# Patient Record
Sex: FEMALE | Race: WHITE | Employment: OTHER | URBAN - METROPOLITAN AREA
[De-identification: names, ages, dates, MRNs, and addresses within clinical notes are randomized per-mention and may not be internally consistent; named-entity substitution may affect disease eponyms.]

---

## 2017-11-30 NOTE — PATIENT DISCUSSION
(H25.11) Age-related nuclear cataract, right eye - Assesment : Examination revealed cataract. Pt reports not bothered at this time. Pt reports BP elevated during CE OS. - Plan : Monitor for changes. Advised patient to call our office with decreased vision or increased symptoms. Updated GLRx given today. RTC in 1 year for Exam, sooner if problems or changes.

## 2017-11-30 NOTE — PATIENT DISCUSSION
Monitor for changes. Advised patient to call our office with decreased vision or increased symptoms.

## 2020-11-12 ENCOUNTER — ESTABLISHED COMPREHENSIVE EXAM (OUTPATIENT)
Dept: URBAN - METROPOLITAN AREA CLINIC 42 | Facility: CLINIC | Age: 75
End: 2020-11-12

## 2020-11-12 DIAGNOSIS — H35.373: ICD-10-CM

## 2020-11-12 DIAGNOSIS — H52.223: ICD-10-CM

## 2020-11-12 DIAGNOSIS — H52.4: ICD-10-CM

## 2020-11-12 DIAGNOSIS — H52.03: ICD-10-CM

## 2020-11-12 PROCEDURE — 92015 DETERMINE REFRACTIVE STATE: CPT

## 2020-11-12 PROCEDURE — 92134 CPTRZ OPH DX IMG PST SGM RTA: CPT

## 2020-11-12 PROCEDURE — 92014 COMPRE OPH EXAM EST PT 1/>: CPT

## 2020-11-12 ASSESSMENT — VISUAL ACUITY
OD_SC: 20/200
OS_SC: 20/100
OU_SC: 20/70-1
OU_SC: 20/200
OD_SC: 20/80
OS_SC: 20/200

## 2020-11-12 ASSESSMENT — KERATOMETRY
OD_K1POWER_DIOPTERS: 43.25
OS_K1POWER_DIOPTERS: 43.25
OD_AXISANGLE2_DEGREES: 119
OD_K2POWER_DIOPTERS: 44.25
OS_K2POWER_DIOPTERS: 44
OS_AXISANGLE2_DEGREES: 74
OD_AXISANGLE_DEGREES: 29
OS_AXISANGLE_DEGREES: 164

## 2020-11-12 ASSESSMENT — TONOMETRY
OS_IOP_MMHG: 20
OD_IOP_MMHG: 20

## 2021-04-20 NOTE — PATIENT DISCUSSION
Borderline intraocular pressure, fluctuates OU. Will continue to monitor. Schedule HVF/RNFL OU next available.

## 2021-06-09 NOTE — PATIENT DISCUSSION
Per JRB's last visit on 11/18/19: Eye OD, Iol Type Limited focus, Post Operative Target [place]/-0.50, Package  LF.

## 2021-06-09 NOTE — PATIENT DISCUSSION
Pt is bothered and symptomatic OD. Recommended CE to improve visual function OD. Discussed procedure, risks, benefits, and lens options. Pt wishes to proceed. Schedule A-scan and consult.

## 2021-07-12 NOTE — PATIENT DISCUSSION
RTC in 1 year for Exam with [facility], sooner if problems or changes.

## 2021-08-10 NOTE — PATIENT DISCUSSION
Per JRB's last visit on 11/18/19: Eye OD, Iol Type Limited focus, Post Operative Target [place]/-0.50, Package  LF.

## 2021-09-07 NOTE — PATIENT DISCUSSION
The risks, benefits and alternatives of cataract surgery were discussed with the patient. Risks including but not limited to: Infection, retinal detachment, lens dislocation, inflammation, loss of vision, increased pressure and need for further surgery. We discussed all the lens options including monofocal lens, toric lens, multifocal lens, astigmatism correction and other options. The patient understands that they may need glasses for optimal vision with any option. Yes...

## 2022-03-29 ENCOUNTER — COMPREHENSIVE EXAM (OUTPATIENT)
Dept: URBAN - METROPOLITAN AREA CLINIC 42 | Facility: CLINIC | Age: 77
End: 2022-03-29

## 2022-03-29 DIAGNOSIS — H52.223: ICD-10-CM

## 2022-03-29 DIAGNOSIS — H52.4: ICD-10-CM

## 2022-03-29 DIAGNOSIS — H35.413: ICD-10-CM

## 2022-03-29 DIAGNOSIS — H52.03: ICD-10-CM

## 2022-03-29 DIAGNOSIS — H35.373: ICD-10-CM

## 2022-03-29 PROCEDURE — 92015 DETERMINE REFRACTIVE STATE: CPT

## 2022-03-29 PROCEDURE — 92134 CPTRZ OPH DX IMG PST SGM RTA: CPT

## 2022-03-29 PROCEDURE — 92014 COMPRE OPH EXAM EST PT 1/>: CPT

## 2022-03-29 ASSESSMENT — TONOMETRY
OS_IOP_MMHG: 18
OD_IOP_MMHG: 18

## 2022-03-29 ASSESSMENT — KERATOMETRY
OS_AXISANGLE_DEGREES: 179
OD_AXISANGLE2_DEGREES: 117
OS_AXISANGLE2_DEGREES: 74
OS_K2POWER_DIOPTERS: 44
OS_K2POWER_DIOPTERS: 44.00
OS_AXISANGLE2_DEGREES: 089
OS_AXISANGLE_DEGREES: 164
OS_K1POWER_DIOPTERS: 43.25
OD_AXISANGLE2_DEGREES: 119
OD_AXISANGLE_DEGREES: 29
OD_K1POWER_DIOPTERS: 43.25
OD_K2POWER_DIOPTERS: 44.25
OD_AXISANGLE_DEGREES: 27

## 2022-03-29 ASSESSMENT — VISUAL ACUITY
OS_SC: 20/70-1
OD_SC: 20/60-2
OU_SC: 20/200
OD_SC: 20/200
OU_SC: 20/60
OS_SC: 20/200

## 2023-04-19 ENCOUNTER — COMPREHENSIVE EXAM (OUTPATIENT)
Dept: URBAN - METROPOLITAN AREA CLINIC 42 | Facility: CLINIC | Age: 78
End: 2023-04-19

## 2023-04-19 DIAGNOSIS — Z01.00: ICD-10-CM

## 2023-04-19 DIAGNOSIS — H52.4: ICD-10-CM

## 2023-04-19 DIAGNOSIS — H52.03: ICD-10-CM

## 2023-04-19 DIAGNOSIS — H52.223: ICD-10-CM

## 2023-04-19 PROCEDURE — 92014 COMPRE OPH EXAM EST PT 1/>: CPT

## 2023-04-19 PROCEDURE — 92015 DETERMINE REFRACTIVE STATE: CPT

## 2023-04-19 ASSESSMENT — KERATOMETRY
OS_AXISANGLE2_DEGREES: 2
OD_K1POWER_DIOPTERS: 44.50
OS_AXISANGLE2_DEGREES: 74
OS_K2POWER_DIOPTERS: 44
OD_K2POWER_DIOPTERS: 43.75
OD_AXISANGLE2_DEGREES: 119
OD_AXISANGLE2_DEGREES: 31
OD_K2POWER_DIOPTERS: 44.25
OS_AXISANGLE_DEGREES: 164
OS_AXISANGLE_DEGREES: 92
OS_AXISANGLE_DEGREES: 179
OS_K2POWER_DIOPTERS: 43.25
OS_K1POWER_DIOPTERS: 43.25
OS_K1POWER_DIOPTERS: 44.00
OD_AXISANGLE_DEGREES: 121
OD_AXISANGLE2_DEGREES: 117
OD_AXISANGLE_DEGREES: 29
OD_AXISANGLE_DEGREES: 27
OD_K1POWER_DIOPTERS: 43.25
OS_AXISANGLE2_DEGREES: 089
OS_K2POWER_DIOPTERS: 44.00

## 2023-04-19 ASSESSMENT — TONOMETRY
OD_IOP_MMHG: 18
OS_IOP_MMHG: 19

## 2024-04-22 ENCOUNTER — COMPREHENSIVE EXAM (OUTPATIENT)
Dept: URBAN - METROPOLITAN AREA CLINIC 42 | Facility: CLINIC | Age: 79
End: 2024-04-22

## 2024-04-22 DIAGNOSIS — H52.223: ICD-10-CM

## 2024-04-22 DIAGNOSIS — H01.021: ICD-10-CM

## 2024-04-22 DIAGNOSIS — H11.153: ICD-10-CM

## 2024-04-22 DIAGNOSIS — H02.834: ICD-10-CM

## 2024-04-22 DIAGNOSIS — H52.03: ICD-10-CM

## 2024-04-22 DIAGNOSIS — H35.413: ICD-10-CM

## 2024-04-22 DIAGNOSIS — H02.831: ICD-10-CM

## 2024-04-22 DIAGNOSIS — H25.89: ICD-10-CM

## 2024-04-22 DIAGNOSIS — H35.373: ICD-10-CM

## 2024-04-22 DIAGNOSIS — H49.10: ICD-10-CM

## 2024-04-22 DIAGNOSIS — H43.813: ICD-10-CM

## 2024-04-22 DIAGNOSIS — H52.4: ICD-10-CM

## 2024-04-22 DIAGNOSIS — H01.024: ICD-10-CM

## 2024-04-22 PROCEDURE — 92015 DETERMINE REFRACTIVE STATE: CPT

## 2024-04-22 PROCEDURE — 92014 COMPRE OPH EXAM EST PT 1/>: CPT

## 2024-04-22 ASSESSMENT — KERATOMETRY
OS_K2POWER_DIOPTERS: 43.25
OD_AXISANGLE2_DEGREES: 119
OS_K1POWER_DIOPTERS: 43.75
OD_K1POWER_DIOPTERS: 43.25
OS_K2POWER_DIOPTERS: 44
OS_K2POWER_DIOPTERS: 43.00
OS_AXISANGLE_DEGREES: 164
OD_AXISANGLE_DEGREES: 121
OD_K1POWER_DIOPTERS: 44.50
OD_AXISANGLE2_DEGREES: 35
OS_AXISANGLE2_DEGREES: 74
OS_K2POWER_DIOPTERS: 44.00
OD_K2POWER_DIOPTERS: 44.25
OS_AXISANGLE2_DEGREES: 2
OS_AXISANGLE_DEGREES: 92
OD_AXISANGLE_DEGREES: 29
OS_AXISANGLE2_DEGREES: 089
OS_K1POWER_DIOPTERS: 43.25
OD_AXISANGLE2_DEGREES: 117
OD_AXISANGLE2_DEGREES: 31
OD_AXISANGLE_DEGREES: 27
OD_AXISANGLE_DEGREES: 125
OS_AXISANGLE_DEGREES: 179
OD_K2POWER_DIOPTERS: 43.75
OS_K1POWER_DIOPTERS: 44.00

## 2024-04-22 ASSESSMENT — TONOMETRY
OD_IOP_MMHG: 12
OS_IOP_MMHG: 12